# Patient Record
Sex: FEMALE | Race: WHITE | NOT HISPANIC OR LATINO | URBAN - METROPOLITAN AREA
[De-identification: names, ages, dates, MRNs, and addresses within clinical notes are randomized per-mention and may not be internally consistent; named-entity substitution may affect disease eponyms.]

---

## 2023-01-03 ENCOUNTER — TELEPHONE (OUTPATIENT)
Dept: TRANSPLANT | Facility: CLINIC | Age: 63
End: 2023-01-03

## 2023-01-03 ENCOUNTER — DOCUMENTATION ONLY (OUTPATIENT)
Dept: TRANSPLANT | Facility: CLINIC | Age: 63
End: 2023-01-03

## 2023-01-03 NOTE — TELEPHONE ENCOUNTER
"Donor Intake Start:22Donor Intake Complete:22  Expiration Date:3/23/23  Gender:FemalePreferred Language:English  Full Name:Daysi Trevino  Needed:[not answered]  Phone Number:[phone] Phone:[phone]  Contact Preference:[not answered]Best Contact Time:9am - 5pm  Emergency Contact:Cameron Aldana Contact #:[phone]  Relationship to Contact:Contact is my spouse  :[birth year]Age:62  Country:[place]  [address] [address] [place]  State:[place] Code:[id number]  Height:5'2\"Weight:170lbs  BMI:31.1  Employment Status:OtherHas PTO for donation?[not answered]  Occupation:[not answered]Requires Heavy Lifting?[not answered]  Education Level:High SchoolMarital Status:  Exercise Routine:OccasionalHealth Insurance:  Yes  Blood Type:UnknownEthnicity/Race:White  Donor Type:Standard Voucher Donor  Prefer Remote Donation:  Yes  Physician:Nely [place]  Motivation to donate:  I would like to help my cousin  Living Donor Pre-Screening  Is In [place]  Yes  Will Accept Blood Transfusions?  Yes  Has been Diagnosed with Kidney Disease?  No  Has had a Heart Attack?  No  Has Diabetes?  No  Has had Cancer?  No  Has had Kidney Stones?  No  Has ever been Pregnant?  Yes    - Is Currently Pregnant?  No    - Months Since Pregnancy?24+    - Is Currently Nursing?  No    - Gestational Diabetes?  No    - Hypertension during pregnancy?  Never  Is Planning on Pregnancy?  No  Is Taking Birth Control?  No  Has Used Tobacco  No  Has HIV?  No  Is Currently Incarcerated?  No  Is Currently Residing in [place]  Yes  History Misc  Has Allergies?  No  Has had Surgeries?  Yes  Surgery When  Hernia Apx.4 yrs  Barbaric 18 yrs  Takes Medication?  No  Medical History  History of High BP?  Never  Has History Of CABG (bypass surgery)?  No  History of Blood Clots?  Never  History of Coronary Disease?  Unknown  Has Stents Implanted?  No  Has History of Chest Pain with " Exercise?  No  Has History of Chest Pain at Other Times?  No  Results of Climbing 2 Flights of Stairs?No Problem  Has had Stress Test within Last Year?  No  Has had Stroke?  No  Has had Leg Bypass?  No  History of Lung Disease?  Never  History of COPD?  Never  History of TB?  Never    - Is TB Active?[not answered]  History of Pneumonia?  Never  Has Respiratory Issues?  No  Has Gastro Issues?  No  History of Gallstones?  Never  History of Pancreatitis?  Never  History of Liver Disease?  Never  History of Hepatitis B?  Never    - Is Hep B Active?[not answered]  History of Hepatitis C?  Never  History of Bleeding Problem?  Never  History of UTIs?  No  History of Kidney Damage?  Never  History of Proteinuria?  Never  History of Hematuria?  Never  History of Neuro Disease?  Never  History of Seizure?  Never  History of Lupus?  Never  History of Paralysis?  Never  History of Arthritis?  Never  History of Neuropathy?  Never  History of Depression?  Never  History of Anxiety?  Never  History of Documented Psychiatric Illness?  Never  History of Fibroid Uterus?  Never  History of Endometriosis?  Never  History of Polycystic Ovaries?  Never  Has had Miscarriages?  Yes    - # of Miscarriages:2    - Had Late Term Miscarriage?  No  Has had Abortions?  No  Has had Transfusions?  No  History of Obesity?  Yes  History of Fabry's Disease?  No  History of Sickle Cell Disease?  No  History of Sickle Cell Trait?  No  History of Sarcoidosis?  No  History of Auto-Immune Disease  No  Has had Physical Exam?  Yes    - how many years ago:2  Has had Mammogram?  Yes    - how many years ago:1  Has had Pap Smear?  Yes    - how many years ago:1  Has had Colonoscopy?  Yes    - How Many Years Ago:1  Medical History Comments?[no comments]  Living Donor Family Medical History  Anyone with kidney disease?  No  Anyone with liver disease?  No  Anyone with heart disease?  Yes    - which family members:Father  Anyone with coronary artery  disease?  No  Anyone with high blood pressure?  No  Anyone with blood disorder?  No  Anyone with cancer?  Yes    - which family members:Father  Anyone with kidney cancer?  No  Anyone with diabetes?  No  Is mother alive?  Yes  Mother's age?83  Is father alive?  No  Father's age?70  Father's cause of death?Colon cancer  How many siblings?4  How many adult children?3  How many children under 18?0  Social History  Has Used Alcohol?  No  Has Used Drugs?  No  Has had legal issues w/ law enforcement?  No  Traveled over 100 miles from home in last year?  Yes    - Traveled Where?[place]  Has had suicidal thoughts or attempts in the last five years?  No  [facility] Terms & Conditions

## 2023-01-12 ENCOUNTER — TELEPHONE (OUTPATIENT)
Dept: TRANSPLANT | Facility: CLINIC | Age: 63
End: 2023-01-12

## 2023-01-12 NOTE — TELEPHONE ENCOUNTER
Initial Independent Living Donor Advocate contact made with potential donor today.  I introduced myself and my role during the donation process, includin.  ZOEY ROLE   The federal government requires that all licensed transplant centers provide the living donor with an Independent Living Donor Advocate (ZEOY).  I do not meet recipients or attend meetings that discuss their care or decision to transplant them. My role is separate to avoid any conflict of interest.  My role is to ensure:  1) your rights are protected;  2) you get all the information you need from the transplant team to make a fully informed decision whether to donate;   3) that living donation is in your best interest.   4) that you have the right to decide NOT to go forward with living donation at any time during this process.  I am available to you throughout the workup, during surgery phase and follow-up at home.     2. WORKUP & PRIVACY     Your identity and workup are not shared with the recipient at any time.     The recipient's insurance covers the medical expenses related to the donor evaluation and surgery.  However, it is important that you carry your own health insurance to address any medical issues that are found and are NOT related to living donation.  Additionally, age appropriate cancer screening (I.e. mammograms,  colonoscopies, etc) is required and would be through your insurance.    There is a psychosocial and medical donor workup that consists of testing to determine if you are healthy enough to donate. Workup tests include tissue typing/genetics, many blood draws, urine collection/ (kidney function testing), chest x-ray, EKG/other heart testing, CT scan. Age appropriate cancer screening is required and would be through your insurance. As you complete each step then you may move on to the next.  Workup can take as little or as long as you need and you can stop the process at any time. Transplant is a treatment option, not a  cure. A kidney from a living kidney donor can last 12-14 years.  Other treatment options are  donation and two types of dialysis.     This is major surgery and your estimated hospital stay is approximately 1-2 nights.  After surgery, there are driving and lifting restrictions - no driving for two weeks and no lifting over ten pounds for 8 weeks.  Donors are routinely off from work for 4 - 6 weeks after surgery, and potentially longer if they have a physical job.       If you anticipate lost wages due to donation, donor wage reimbursement options may be available to you and will be reviewed with you during the evaluation process. Donor Shield and NLDAC explained.    We reviewed the importance of completing follow-up labs and surveys at six months, 1 year and 2 years after donation to monitor kidney health and the impact donation has had on their life post donation.       3.  QUESTIONS    Have you received the Welcome e-mail that includes copies of the informed consent, financial letter, information on donor shield and NLDAC from the transplant department? Yes. Questions? Yes.    Have you discussed with anyone your potential decision to donate?   Yes.    Is anyone pressuring or coercing you to donate? No.    Have you discussed any financial arrangements with recipient around donating a kidney? No.    Are you aware that you can confidentially opt out at any time, up to and including day of donation? Yes.    At this time, would you like to proceed with the medical evaluation to see if you can be a kidney donor?  Yes.    If yes, I will make an appointment for your donor coordinator to reach out to you with next steps.     Contact information for ZOEY's was provided Yes.    Gisela Perera- [phone]  Georgia Day- [phone]      Time frame for donation: open  Paired exchange was introduced  No.      MyChart was initiated  Yes.  CareEverywhere was initiated No.    ZOEY NOTES: Daysi wants to donate a kidney  to her cousin  She is scheduled to talk with Estefani Ruelas RN, on 1/17/23 at 11:30 am      Duration of call 30 minutes

## 2023-01-17 ENCOUNTER — TELEPHONE (OUTPATIENT)
Dept: TRANSPLANT | Facility: CLINIC | Age: 63
End: 2023-01-17

## 2023-01-17 DIAGNOSIS — Z00.5 TRANSPLANT DONOR EVALUATION: Primary | ICD-10-CM

## 2023-01-17 NOTE — TELEPHONE ENCOUNTER
Contacted Daysi Toure to introduce myself and my role, review of medical/surgical/family history and next steps.     Daysi Toure  is aware She can stop donor evaluation at any time.    Have you ever been positive for COVID 19? Yes    Have you received the COVID vaccination series? yes     Reviewed risk of COVID-19 to living donors.    Regular blood donor? No   Daysi Toure is a 62 year old female  ABO unknown that would like to learn more about donation to her cousin.     Concerns from medical/surgical/family history: hypothyroidism, hormone replacement therapy, surigcal history of hernia repair, gastric bypass, gall bladder removal    Current medications and NSAID use: iron, synthroid    Legal issues w/ law enforcement: no    Reviewed any history of travel in endemic areas: [place], [place]  Strongyloides- [place], [place] and [place].  Trypanosoma cruzi ([place])- [place]  West Nile Virus- [place], [place], [place], [place] and [place].     Per our Phase 1 algorithm, does meet criteria to do preliminary testing.     Reviewed evaluation testing: Iohexol, Lab work, CXR, EKG, Provider visits and functions, CT Angiogram.     Reviewed operations of selection committee and angio review meetings and the need for multidisciplinary input. Post-donation requirements include post-op appointment with your surgeon at 2 weeks after surgery, 6 week, 6 month, 1 year and 2 year lab tests.     Reviewed [facility] listing and transfer of care to [facility] team if approved. Provided Daysi with [facility] website to review.     Briefly went over options if approved of NDD and voucher donation.     Daysi would like to proceed with next steps: phase 1 testing and tissue typing. She would come to [place] to donate if she is a direct match for her cousin, if not then she would like to do remote donation in [facility].      Confirmed that Daysi reviewed Informed consent document and all questions answered.   Reviewed that they will receive Docusign to obtain electronic signature for the following: Informed consent, SRTR data, BRANDON for medical information, Auth for Electronic communication and will need their signed consent back before proceeding with evaluation.      Encouraged sign up for [employer] and reviewed importance of watching teaching videos prior to evaluation.     Verified recipient status if not NDD.    Donor timeline: TBD     Will send orders to scheduling team to set up for phase 1 testing.

## 2023-01-31 ENCOUNTER — TELEPHONE (OUTPATIENT)
Dept: TRANSPLANT | Facility: CLINIC | Age: 63
End: 2023-01-31

## 2023-01-31 NOTE — TELEPHONE ENCOUNTER
"Received this Email in response to when she was planning to do Phase 1 tests:  \"Good morning   I have my appointment Feb. 1st. for bloodwork and will get blood pressure,weight and height for you asap.  Thank you   Alma Toure\"    "

## 2023-02-01 ENCOUNTER — DOCUMENTATION ONLY (OUTPATIENT)
Dept: TRANSPLANT | Facility: CLINIC | Age: 63
End: 2023-02-01